# Patient Record
Sex: MALE | Race: BLACK OR AFRICAN AMERICAN | Employment: FULL TIME | URBAN - METROPOLITAN AREA
[De-identification: names, ages, dates, MRNs, and addresses within clinical notes are randomized per-mention and may not be internally consistent; named-entity substitution may affect disease eponyms.]

---

## 2018-08-06 ENCOUNTER — HOSPITAL ENCOUNTER (EMERGENCY)
Age: 48
Discharge: HOME | End: 2018-08-06
Attending: EMERGENCY MEDICINE
Payer: SELF-PAY

## 2018-08-06 ENCOUNTER — APPOINTMENT (OUTPATIENT)
Dept: GENERAL RADIOLOGY | Age: 48
End: 2018-08-06
Attending: EMERGENCY MEDICINE
Payer: SELF-PAY

## 2018-08-06 VITALS
BODY MASS INDEX: 28.88 KG/M2 | SYSTOLIC BLOOD PRESSURE: 147 MMHG | HEIGHT: 69 IN | OXYGEN SATURATION: 97 % | RESPIRATION RATE: 16 BRPM | DIASTOLIC BLOOD PRESSURE: 96 MMHG | HEART RATE: 67 BPM | TEMPERATURE: 98.2 F | WEIGHT: 195 LBS

## 2018-08-06 DIAGNOSIS — S90.822A BLISTER OF LEFT HEEL, INITIAL ENCOUNTER: Primary | ICD-10-CM

## 2018-08-06 PROCEDURE — 77030036687 HC SHOE PSTOP S2SG -A

## 2018-08-06 PROCEDURE — 99283 EMERGENCY DEPT VISIT LOW MDM: CPT

## 2018-08-06 PROCEDURE — 73630 X-RAY EXAM OF FOOT: CPT

## 2018-08-06 NOTE — LETTER
NOTIFICATION RETURN TO WORK / SCHOOL 
 
8/6/2018 9:35 AM 
 
Mr. Jannet [facility] [id number] To Whom It May Concern: 
 
Jannet Abraham is currently under the care of [facility] EMERGENCY DEPT. He will return to work/school on: 8/10/18 If there are questions or concerns please have the patient contact our office. Sincerely, Maria De Jesus Harrison MD

## 2018-08-06 NOTE — ED PROVIDER NOTES
EMERGENCY DEPARTMENT HISTORY AND PHYSICAL EXAM    9:04 AM      Date: 8/6/2018  Patient Name: Didier Gutiérrez    History of Presenting Illness     Chief Complaint   Patient presents with    Foot Pain         History Provided By: Patient    Chief Complaint: Foot Pain  Duration:4  Days  Timing:  Constant  Location: Left plantart surface of the heel  Quality: Sharp  Severity: Moderate  Modifying Factors: Exacerbated with walking  Associated Symptoms: denies any other associated signs or symptoms      Additional History (Context): 9:30 AM Didier Gutiérrez is a 52 y.o. male with h/o No pertinent MHx who presents to ED complaining of moderate sharp constant left foot pain to the plantart surface of the heel onset 4 days ago. Patient states that he thought it was a callous but the pain is \"really bad\". He has been using band aids with compound W and ointment on it with no relief of sx. States that the pain is exacerbated with walking. He believes the callous formed due to the shoes he wears for work as he is a . He states that it started out small and after he used the compound W it worsened. Denies stepping on any foreign object or trauma to the foot. No other concerns or symptoms at this time. PCP: None        Past History     Past Medical History:  History reviewed. No pertinent past medical history. Past Surgical History:  Past Surgical History:   Procedure Laterality Date    HX ORTHOPAEDIC      femur       Family History:  History reviewed. No pertinent family history. Social History:  Social History   Substance Use Topics    Smoking status: Current Every Day Smoker    Smokeless tobacco: Never Used    Alcohol use No       Allergies:  No Known Allergies      Review of Systems       Review of Systems   Constitutional: Negative for chills and fever. Musculoskeletal:        Left foot pain   Skin:        Callous to  Bottom of the foot   All other systems reviewed and are negative.         Physical Exam     Visit Vitals    BP (!) 147/96 (BP 1 Location: Right arm, BP Patient Position: Sitting)    Pulse 67    Temp 98.2 °F (36.8 °C)    Resp 16    Ht 5' 9\" (1.753 m)    Wt 88.5 kg (195 lb)    SpO2 97%    BMI 28.8 kg/m2         Physical Exam   Constitutional: He is oriented to person, place, and time. He appears well-developed and well-nourished. No distress. HENT:   Head: Normocephalic and atraumatic. Mouth/Throat: Oropharynx is clear and moist.   Eyes: Conjunctivae and EOM are normal. Pupils are equal, round, and reactive to light. No scleral icterus. Neck: Normal range of motion. Neck supple. Cardiovascular: Normal rate, regular rhythm and normal heart sounds. No murmur heard. Pulmonary/Chest: Effort normal and breath sounds normal. No respiratory distress. Abdominal: Soft. Bowel sounds are normal. He exhibits no distension. There is no tenderness. Musculoskeletal: He exhibits no edema. Lymphadenopathy:     He has no cervical adenopathy. Neurological: He is alert and oriented to person, place, and time. Coordination normal.   Skin: Skin is warm and dry. No rash noted. [id number] callous on plantar surface of heel   Psychiatric: He has a normal mood and affect. His behavior is normal.   Nursing note and vitals reviewed. Diagnostic Study Results     Labs -  No results found for this or any previous visit (from the past 12 hour(s)). Radiologic Studies -   XR FOOT LT MIN 3 V   Final Result   Impression:      IMPRESSION:  1. No acute osseous abnormality or malalignment. 2. Mild left first MTP joint osteoarthritis. 3. Small plantar calcaneal spur. Medical Decision Making   I am the first provider for this patient. I reviewed the vital signs, available nursing notes, past medical history, past surgical history, family history and social history. Vital Signs-Reviewed the patient's vital signs.     Pulse Oximetry Analysis -  97% on room air ,WNL    Records Reviewed: Nursing Notes and Old Medical Records (Time of Review: 9:04 AM)    ED Course: Progress Notes, Reevaluation, and Consults:      Provider Notes (Medical Decision Making):   MDM  Number of Diagnoses or Management Options  Blister of left heel, initial encounter:   Diagnosis management comments: Chemical irritation and blister of heel no evidence of infection will tx supportive care and podiatry referral       Diagnosis     Clinical Impression:   1. Blister of left heel, initial encounter        Disposition: DC    Follow-up Information     Follow up With Details Comments Contact [facility] EMERGENCY DEPT  As needed, If symptoms worsen [address] [place] [address] Schedule an appointment as soon as possible for a visit for ED follow up appointment    [facility] and [address] [place] [address]  [phone]             Patient's Medications    No medications on file     _______________________________    Attestations:  Ajit Moreno acting as a scribe for and in the presence of Nathan Singer MD      August 06, 2018 at 9:04 AM       Provider Attestation:      I personally performed the services described in the documentation, reviewed the documentation, as recorded by the scribe in my presence, and it accurately and completely records my words and actions.  August 06, 2018 at 9:04 AM - Nathan Singer MD    _______________________________

## 2020-06-08 ENCOUNTER — HOSPITAL ENCOUNTER (EMERGENCY)
Age: 50
Discharge: HOME | End: 2020-06-08
Attending: EMERGENCY MEDICINE
Payer: COMMERCIAL

## 2020-06-08 VITALS
WEIGHT: 180 LBS | RESPIRATION RATE: 17 BRPM | OXYGEN SATURATION: 99 % | HEIGHT: 70 IN | SYSTOLIC BLOOD PRESSURE: 153 MMHG | TEMPERATURE: 98.2 F | BODY MASS INDEX: 25.77 KG/M2 | DIASTOLIC BLOOD PRESSURE: 89 MMHG | HEART RATE: 86 BPM

## 2020-06-08 DIAGNOSIS — T50.901A ACCIDENTAL DRUG OVERDOSE, INITIAL ENCOUNTER: Primary | ICD-10-CM

## 2020-06-08 DIAGNOSIS — F12.10 MARIJUANA ABUSE: ICD-10-CM

## 2020-06-08 DIAGNOSIS — R44.0 AUDITORY HALLUCINATION: ICD-10-CM

## 2020-06-08 LAB
ALBUMIN SERPL-MCNC: 3.7 G/DL (ref 3.4–5)
ALBUMIN/GLOB SERPL: 1 {RATIO} (ref 0.8–1.7)
ALP SERPL-CCNC: 84 U/L (ref 45–117)
ALT SERPL-CCNC: 33 U/L (ref 16–61)
AMPHET UR QL SCN: NEGATIVE
ANION GAP SERPL CALC-SCNC: 3 MMOL/L (ref 3–18)
APAP SERPL-MCNC: <2 UG/ML (ref 10–30)
AST SERPL-CCNC: 15 U/L (ref 10–38)
ATRIAL RATE: 56 BPM
BARBITURATES UR QL SCN: NEGATIVE
BASOPHILS # BLD: 0 K/UL (ref 0–0.1)
BASOPHILS NFR BLD: 0 % (ref 0–2)
BENZODIAZ UR QL: NEGATIVE
BILIRUB SERPL-MCNC: 0.3 MG/DL (ref 0.2–1)
BUN SERPL-MCNC: 12 MG/DL (ref 7–18)
BUN/CREAT SERPL: 10 (ref 12–20)
CALCIUM SERPL-MCNC: 8.6 MG/DL (ref 8.5–10.1)
CALCULATED P AXIS, ECG09: 57 DEGREES
CALCULATED R AXIS, ECG10: -21 DEGREES
CALCULATED T AXIS, ECG11: 0 DEGREES
CANNABINOIDS UR QL SCN: POSITIVE
CHLORIDE SERPL-SCNC: 108 MMOL/L (ref 100–111)
CK MB CFR SERPL CALC: 0.6 % (ref 0–4)
CK MB SERPL-MCNC: 1.7 NG/ML (ref 5–25)
CK SERPL-CCNC: 271 U/L (ref 39–308)
CO2 SERPL-SCNC: 29 MMOL/L (ref 21–32)
COCAINE UR QL SCN: NEGATIVE
CREAT SERPL-MCNC: 1.16 MG/DL (ref 0.6–1.3)
DIAGNOSIS, 93000: NORMAL
DIFFERENTIAL METHOD BLD: NORMAL
EOSINOPHIL # BLD: 0.1 K/UL (ref 0–0.4)
EOSINOPHIL NFR BLD: 1 % (ref 0–5)
ERYTHROCYTE [DISTWIDTH] IN BLOOD BY AUTOMATED COUNT: 14 % (ref 11.6–14.5)
ETHANOL SERPL-MCNC: <3 MG/DL (ref 0–3)
GLOBULIN SER CALC-MCNC: 3.7 G/DL (ref 2–4)
GLUCOSE SERPL-MCNC: 103 MG/DL (ref 74–99)
HCT VFR BLD AUTO: 42.1 % (ref 36–48)
HDSCOM,HDSCOM: ABNORMAL
HGB BLD-MCNC: 14.1 G/DL (ref 13–16)
LYMPHOCYTES # BLD: 1.8 K/UL (ref 0.9–3.6)
LYMPHOCYTES NFR BLD: 27 % (ref 21–52)
MCH RBC QN AUTO: 28.5 PG (ref 24–34)
MCHC RBC AUTO-ENTMCNC: 33.5 G/DL (ref 31–37)
MCV RBC AUTO: 85.2 FL (ref 74–97)
METHADONE UR QL: NEGATIVE
MONOCYTES # BLD: 0.6 K/UL (ref 0.05–1.2)
MONOCYTES NFR BLD: 9 % (ref 3–10)
NEUTS SEG # BLD: 4.2 K/UL (ref 1.8–8)
NEUTS SEG NFR BLD: 63 % (ref 40–73)
OPIATES UR QL: NEGATIVE
P-R INTERVAL, ECG05: 162 MS
PCP UR QL: NEGATIVE
PLATELET # BLD AUTO: 326 K/UL (ref 135–420)
PMV BLD AUTO: 9.7 FL (ref 9.2–11.8)
POTASSIUM SERPL-SCNC: 3.6 MMOL/L (ref 3.5–5.5)
PROT SERPL-MCNC: 7.4 G/DL (ref 6.4–8.2)
Q-T INTERVAL, ECG07: 412 MS
QRS DURATION, ECG06: 90 MS
QTC CALCULATION (BEZET), ECG08: 397 MS
RBC # BLD AUTO: 4.94 M/UL (ref 4.7–5.5)
SALICYLATES SERPL-MCNC: 2.7 MG/DL (ref 2.8–20)
SODIUM SERPL-SCNC: 140 MMOL/L (ref 136–145)
TROPONIN I SERPL-MCNC: <0.02 NG/ML (ref 0–0.04)
VENTRICULAR RATE, ECG03: 56 BPM
WBC # BLD AUTO: 6.7 K/UL (ref 4.6–13.2)

## 2020-06-08 PROCEDURE — 80053 COMPREHEN METABOLIC PANEL: CPT

## 2020-06-08 PROCEDURE — 80307 DRUG TEST PRSMV CHEM ANLYZR: CPT

## 2020-06-08 PROCEDURE — 74011250636 HC RX REV CODE- 250/636: Performed by: EMERGENCY MEDICINE

## 2020-06-08 PROCEDURE — 99285 EMERGENCY DEPT VISIT HI MDM: CPT

## 2020-06-08 PROCEDURE — 85025 COMPLETE CBC W/AUTO DIFF WBC: CPT

## 2020-06-08 PROCEDURE — 82550 ASSAY OF CK (CPK): CPT

## 2020-06-08 PROCEDURE — 93005 ELECTROCARDIOGRAM TRACING: CPT

## 2020-06-08 PROCEDURE — 74011000250 HC RX REV CODE- 250: Performed by: EMERGENCY MEDICINE

## 2020-06-08 RX ADMIN — POISON ADSORBENT 50 G: 50 SUSPENSION ORAL at 01:18

## 2020-06-08 RX ADMIN — SODIUM CHLORIDE 1000 ML: 900 INJECTION, SOLUTION INTRAVENOUS at 01:47

## 2020-06-08 NOTE — DISCHARGE INSTRUCTIONS
Patient Education        Marijuana Use: Care Instructions  Overview  During your exam, traces of marijuana were found in your body. The two most active chemicals in marijuana are THC and CBD. THC affects how you think, act, and feel. It can make you feel very happy or \"high. \" CBD can help you feel relaxed without the \"high. \" Marijuana products usually contain both THC and CBD. THC usually can be found in urine for a few days after marijuana is used. If you regularly use a lot of marijuana, THC may be found for weeks after use has stopped. There are many types, or strains, of marijuana. Each strain has specific THC-to-CBD ratios. Because of this, some strains have different kinds of effects than others. For example, if a strain of marijuana has a higher ratio of THC to CBD, it's more likely to affect your judgment, coordination, and decision making. In the [place], it's against federal law to possess, sell, give away, or grow marijuana for any purpose. But many states allow people with certain health problems to buy or grow it for their own use. And some states allow people to use it for recreational reasons. These laws vary from state to state. You can call your state department of health or health services to learn more about the laws in your state. If you live in a state where marijuana is legal, know your employer's policies about use. A positive drug test might cause you to lose your job. Or it might keep you from getting hired. If you use marijuana, take steps to lower your risk. Follow-up care is a key part of your treatment and safety. Be sure to make and go to all appointments, and call your doctor if you are having problems. It's also a good idea to know your test results and keep a list of the medicines you take. How can you care for yourself at home? · To have the lowest risk, don't use marijuana. But if you do use it, limit your use. · Know what you're using.  Choose products that have low levels of THC. The type (or strain), strength, and effects of marijuana can vary greatly. And understand how soon you may feel the effects of the product you use and how long those effects may last. The product label may have this information. · Don't drive or operate machinery after using marijuana. Using marijuana may affect your judgment, coordination, and decision making. · Don't smoke marijuana. The smoke can damage your lungs. If you do smoke it, don't breathe in deeply and don't hold your breath. · Don't use marijuana with alcohol, tobacco, or illegal drugs. · Reduce the risk of medicine interactions. Marijuana can be dangerous if you take it with blood thinners or with medicines that make you sleepy, control your mood, or lower your blood pressure. Talk to your doctor about other medicines you use before you try marijuana. · Keep others safe. Store marijuana in a safe and secure place. This is even more important with edible marijuana, which can be easily mistaken for treats or snacks. Make sure that children, friends, family, and pets can't get to it. And protect others from secondhand smoke. When should you call for help? Call your doctor now or seek immediate medical care if:  · You have new or worse symptoms of cannabis hyperemesis syndrome (CHS), such as:  ? Vomiting that doesn't stop. ? Not being able to keep down fluids. ? Belly pain. ? Symptoms that go away briefly when you take a hot bath or shower. This is one of the signs of CHS. · You have symptoms of dehydration, such as:  ? Dry eyes and a dry mouth. ? Passing only a little dark urine. ? Feeling thirstier than usual.  Watch closely for changes in your health, and contact your doctor if:  · You think you have a problem with marijuana use. Where can you learn more? Go to http://doe-mercedes.info/  Enter P683 in the search box to learn more about \"Marijuana Use: Care Instructions. \"  Current as of: August 22, 6885               [employer] Version: 12.5  © 1625-5847 [employer]. Care instructions adapted under license by [employer] (which disclaims liability or warranty for this information). If you have questions about a medical condition or this instruction, always ask your healthcare professional. [address] any warranty or liability for your use of this information. Patient Education        Accidental Overdose of Medicine: Care Instructions  Your Care Instructions     Almost any medicine can cause harm if you take too much of it. You have had treatment to help your body get rid of an overdose of a medicine. This may have been an over-the-counter medicine. Or it might be one that a doctor prescribed. It may even have been a vitamin or a supplement. During treatment, the doctor may have given you fluids and medicine. You also may have had lab tests. Then the doctor made sure that you were well enough to go home. The doctor has checked you carefully, but problems can develop later. If you notice any problems or new symptoms, get medical treatment right away. Follow-up care is a key part of your treatment and safety. Be sure to make and go to all appointments, and call your doctor if you are having problems. It's also a good idea to know your test results and keep a list of the medicines you take. How can you care for yourself at home? Home care  · Talk with your doctor about what you should eat or drink. · If you normally take medicines, ask your doctor when you can start taking them again. · Read the information that comes with any medicine. If you have questions, ask your doctor or pharmacist.  Prevention  · Be safe with medicines. Take your medicines exactly as prescribed or as the label directs. Call your doctor if you think you are having a problem with your medicine. · Keep your medicines in the containers they came in.  Keep them with the original labels. · Find out what to do if you miss a dose of your medicine. When should you call for help? Poison control centers, hospitals, or your doctor can give immediate advice in the case of a poisoning. The [facility] number is [phone]. Have the poison container with you so you can give complete information to the poison control center. This includes what the poison or substance is, when it was taken, and how much was taken. Do not try to make the person vomit. [id number] anytime you think you may need emergency care. For example, call if you or someone else:  · Has used or currently uses alcohol or drugs and is very confused or can't stay awake. · Has passed out (lost consciousness). · Has severe trouble breathing. · Is having a seizure. Call your doctor now or seek immediate medical care if:  · You are vomiting. · You have a new or worse headache. · You are dizzy or lightheaded or feel like you may faint. Watch closely for changes in your health, and be sure to contact your doctor if:  · You do not get better as expected. Where can you learn more? Go to http://doe-mercedes.info/  Enter C165 in the search box to learn more about \"Accidental Overdose of Medicine: Care Instructions. \"  Current as of: August 22, 2019               Content Version: 12.5  © 3323-9381 [employer]. Care instructions adapted under license by [employer] (which disclaims liability or warranty for this information). If you have questions about a medical condition or this instruction, always ask your healthcare professional. [place] [address] any warranty or liability for your use of this information.

## 2020-06-08 NOTE — ED NOTES
Poison Control called spoke with Johnna Kaminski- patient update given, vital signs, lab results, EKG interpretation

## 2020-06-08 NOTE — PROGRESS NOTES
Referral received to assist with outpatient behavioral follow up. Chart reviewed. Spoke with pt. He wants to leave now to check on his children. He was made aware that psychiatrist recommends for him to follow up with behavioral services. Face sheet information verified and not accurate. Pt has insurance and his phone is incorrect. Gave information to registration and chart updated. Pt states has a medical appointment at [facility] this Friday 6/12/20. Pt unable to name the provider he is scheduled to see. Pt agreed for me to assist with behavioral follow up and will call him with information. Called [facility] and left a message to return call.

## 2020-06-08 NOTE — ED TRIAGE NOTES
Pt to ED via EMS c/o intentional overdose on sleeping medicine. States he 'just wants to sleep and see (his) step son ' who  2 years ago. Pt states he doesn't want to kill himself just that he wanted to sleep so he could see his son. States he also took 1 gabapentin of unknown dosage. Pt states he heard voices telling him to go to sleep and that he used to hear them back when he was in the [employer] as well. States he only ever has auditory hallucinations- only ever tell him to 'go to sleep', denies visual hallucinations. Has never been seen by psych. Denies SI/HI to this nurse.

## 2020-06-08 NOTE — ED NOTES
Poison center staff called and this nurse spoke with her regarding the status of the patient. Patient is currently alert with clear speech.

## 2020-06-08 NOTE — ED PROVIDER NOTES
[facility] EMERGENCY DEPT      1:29 AM    Date: 6/8/2020  Patient Name: Devorah Mcmahon    History of Presenting Illness     Chief Complaint   Patient presents with    Drug Overdose    Mental Health Problem       52 y.o. male with noted past medical history who presents to the emergency department status post overdose of sleeping pill and taking 1 gabapentin as well. The patient states that he has a history of auditory hallucinations and the voices always tell him to sleep. He is had these auditory elucidation's only telling him to sleep since he was in the [place] 30 years ago. He states that gotten worse since his son was killed. Tonight he had a voice telling him that he needed to go to sleep he took 8 sleep aid pills with the brand name \"sleep aid\". He also took 1 gabapentin he is unsure of the dosing that was full-term on the street. On initial MD exam the patient he is awake alert has no GI distress and no somnolence. He denies any suicidal homicidal ideation. Patient denies any other associated signs or symptoms. Patient denies any other complaints. Nursing notes regarding the HPI and triage nursing notes were reviewed. Prior medical records were reviewed. Past History     Past Medical History:  History reviewed. No pertinent past medical history. Past Surgical History:  Past Surgical History:   Procedure Laterality Date    HX ORTHOPAEDIC      femur       Family History:  History reviewed. No pertinent family history. Social History:  Social History     Tobacco Use    Smoking status: Current Every Day Smoker    Smokeless tobacco: Never Used   Substance Use Topics    Alcohol use: No    Drug use: Not on file       Allergies:  No Known Allergies    Patient's primary care provider (as noted in EPIC):  None    Review of Systems   Constitutional: Negative for diaphoresis. HENT: Negative for congestion. Eyes: Negative for discharge. Respiratory: Negative for stridor. Cardiovascular: Negative for palpitations. Gastrointestinal: Negative for diarrhea. Endocrine: Negative for heat intolerance. Genitourinary: Negative for flank pain. Musculoskeletal: Negative for back pain. Neurological: Negative for weakness. Psychiatric/Behavioral: Negative for hallucinations and suicidal ideas. All other systems reviewed and are negative. Visit Vitals  /79   Pulse 86   Temp 98.2 °F (36.8 °C)   Resp 18   Ht 5' 10\" (1.778 m)   Wt 81.6 kg (180 lb)   SpO2 99%   BMI 25.83 kg/m²       PHYSICAL EXAM:    CONSTITUTIONAL:  Alert, in no apparent distress;  well developed;  well nourished. HEAD:  Normocephalic, atraumatic. EYES:  EOMI. Non-icteric sclera. Normal conjunctiva. ENTM:  Nose:  no rhinorrhea. Throat:  no erythema or exudate, mucous membranes moist.  NECK:  No JVD. Supple  RESPIRATORY:  Chest clear, equal breath sounds, good air movement. CARDIOVASCULAR:  Regular rate and rhythm. No murmurs, rubs, or gallops. GI:  Normal bowel sounds, abdomen soft and non-tender. No rebound or guarding. BACK:  Non-tender. UPPER EXT:  Normal inspection. LOWER EXT:  No edema, no calf tenderness. Distal pulses intact. NEURO:  Moves all four extremities. Normal motor exam and sensation in all four extremities. Normal CN II-XII exam.  Normal bilateral finger-to-nose exam.     SKIN:  No rashes;  Normal for age. PSYCH:  Alert and normal affect. DIFFERENTIAL DIAGNOSES/ MEDICAL DECISION MAKING:  Hypoglycemia, acute alcohol, drug, or multipharmacy intoxication, sepsis from numerous possible sources including urosepsis, pneumonia, meningitis, significant CVA, TIA, intracerebral hemorrhage, subdural hemorrhage, seizure, significant trauma, electrolyte or hormonal imbalance, other etiologies, versus a combination of the above.     ED COURSE:    Consult Poison Control at [phone]    [facility] was called and the patient was presented for treatment recommendations for overdose of:  'Sleep aid' medication - 8 tablets  1 gabapentin tablet, unknown dose    Treatment recommendations from the poison control center specific to this patient presentation today are:  Bleeding component and sleepy medications diphenhydramine. No worry about the 1 gabapentin tablet as per poison control. Recommendations per poison control as follows: Observation 6 hours from time of ingestion. If medically cleared given his presentation would consult tele-psychiatry. Critical Care Note:  Overdose    Critical care minutes: 52 MINUTES. Given patients presentation to ed with noted overdose, serial neurological examinations were performed, as well as evaluation of vital signs. Given the patients underlying condition medical intervention(s) were needed, requiring numerous reevaluations of patient's vital signs and response to different emergency department therapies, total bedside time evaluating and/or treating the patient, not including procedures, is noted below. Signout Note      Pt care transferred to Dr. Trisha Lozano  ,ED provider. History of patient complaint(s), available diagnostic reports and current treatment plan has been discussed thoroughly. Bedside rounding on patient occured : no . Intended disposition of patient : To be determined  Pending diagnostics reports and/or labs (please list): 6-hour observation window would and at 0700 hrs. this morning. If patient is normal then with no somnolence or no anticholinergic effects, can be medically cleared. Would consult tele-psychiatry after that given the patient's auditory commands with overdose ingestion today.         Diagnostic Study Results     Abnormal lab results from this emergency department encounter:  Labs Reviewed   METABOLIC PANEL, COMPREHENSIVE - Abnormal; Notable for the following components:       Result Value    Glucose 103 (*)     BUN/Creatinine ratio 10 (*)     All other components within normal limits SALICYLATE - Abnormal; Notable for the following components:    Salicylate level 2.7 (*)     All other components within normal limits   ACETAMINOPHEN - Abnormal; Notable for the following components:    Acetaminophen level <2 (*)     All other components within normal limits   DRUG SCREEN, URINE - Abnormal; Notable for the following components:    THC (TH-CANNABINOL) Positive (*)     All other components within normal limits   CBC WITH AUTOMATED DIFF   CARDIAC PANEL,(CK, CKMB & TROPONIN)   ETHYL ALCOHOL       Lab values for this patient within approximately the last 12 hours:  Recent Results (from the past 12 hour(s))   CBC WITH AUTOMATED DIFF    Collection Time: 06/08/20  1:20 AM   Result Value Ref Range    WBC 6.7 4.6 - 13.2 K/uL    RBC 4.94 4.70 - 5.50 M/uL    HGB 14.1 13.0 - 16.0 g/dL    HCT 42.1 36.0 - 48.0 %    MCV 85.2 74.0 - 97.0 FL    MCH 28.5 24.0 - 34.0 PG    MCHC 33.5 31.0 - 37.0 g/dL    RDW 14.0 11.6 - 14.5 %    PLATELET 058 252 - 723 K/uL    MPV 9.7 9.2 - 11.8 FL    NEUTROPHILS 63 40 - 73 %    LYMPHOCYTES 27 21 - 52 %    MONOCYTES 9 3 - 10 %    EOSINOPHILS 1 0 - 5 %    BASOPHILS 0 0 - 2 %    ABS. NEUTROPHILS 4.2 1.8 - 8.0 K/UL    ABS. LYMPHOCYTES 1.8 0.9 - 3.6 K/UL    ABS. MONOCYTES 0.6 0.05 - 1.2 K/UL    ABS. EOSINOPHILS 0.1 0.0 - 0.4 K/UL    ABS. BASOPHILS 0.0 0.0 - 0.1 K/UL    DF AUTOMATED     METABOLIC PANEL, COMPREHENSIVE    Collection Time: 06/08/20  1:20 AM   Result Value Ref Range    Sodium 140 136 - 145 mmol/L    Potassium 3.6 3.5 - 5.5 mmol/L    Chloride 108 100 - 111 mmol/L    CO2 29 21 - 32 mmol/L    Anion gap 3 3.0 - 18 mmol/L    Glucose 103 (H) 74 - 99 mg/dL    BUN 12 7.0 - 18 MG/DL    Creatinine 1.16 0.6 - 1.3 MG/DL    BUN/Creatinine ratio 10 (L) 12 - 20      GFR est AA >60 >60 ml/min/1.73m2    GFR est non-AA >60 >60 ml/min/1.73m2    Calcium 8.6 8.5 - 10.1 MG/DL    Bilirubin, total 0.3 0.2 - 1.0 MG/DL    ALT (SGPT) 33 16 - 61 U/L    AST (SGOT) 15 10 - 38 U/L    Alk.  phosphatase 84 45 - 117 U/L    Protein, total 7.4 6.4 - 8.2 g/dL    Albumin 3.7 3.4 - 5.0 g/dL    Globulin 3.7 2.0 - 4.0 g/dL    A-G Ratio 1.0 0.8 - 1.7     CARDIAC PANEL,(CK, CKMB & TROPONIN)    Collection Time: 06/08/20  1:20 AM   Result Value Ref Range    CK - MB 1.7 <3.6 ng/ml    CK-MB Index 0.6 0.0 - 4.0 %     39 - 308 U/L    Troponin-I, QT <0.02 0.0 - 0.045 NG/ML   DRUG SCREEN, URINE    Collection Time: 06/08/20  1:20 AM   Result Value Ref Range    BENZODIAZEPINES Negative NEG      BARBITURATES Negative NEG      THC (TH-CANNABINOL) Positive (A) NEG      OPIATES Negative NEG      PCP(PHENCYCLIDINE) Negative NEG      COCAINE Negative NEG      AMPHETAMINES Negative NEG      METHADONE Negative NEG      HDSCOM (NOTE)    SALICYLATE    Collection Time: 06/08/20  1:30 AM   Result Value Ref Range    Salicylate level 2.7 (L) 2.8 - 20.0 MG/DL   ACETAMINOPHEN    Collection Time: 06/08/20  1:30 AM   Result Value Ref Range    Acetaminophen level <2 (L) 10.0 - 30.0 ug/mL   ETHYL ALCOHOL    Collection Time: 06/08/20  1:30 AM   Result Value Ref Range    ALCOHOL(ETHYL),SERUM <3 0 - 3 MG/DL   EKG, 12 LEAD, SUBSEQUENT    Collection Time: 06/08/20  1:39 AM   Result Value Ref Range    Ventricular Rate 56 BPM    Atrial Rate 56 BPM    P-R Interval 162 ms    QRS Duration 90 ms    Q-T Interval 412 ms    QTC Calculation (Bezet) 397 ms    Calculated P Axis 57 degrees    Calculated R Axis -21 degrees    Calculated T Axis 0 degrees    Diagnosis       Sinus bradycardia  Minimal voltage criteria for LVH, may be normal variant ( R in aVL )  Borderline ECG  No previous ECGs available         Radiologist and cardiologist interpretations if available at time of this note:  No results found.      Emergency physician interpretation of EKG: Sinus bradycardia about 55 bpm.    Medication(s) ordered for patient during this emergency visit encounter:  Medications   sodium chloride 0.9 % bolus infusion 1,000 mL (0 mL IntraVENous IV Completed 6/8/20 0248)   activated charcoal-sorbitoL (ACTIDOSE) oral suspension 50 g (50 g Oral Given 6/8/20 0118)       Medical Decision Making     I am the first provider for this patient. I reviewed the vital signs, available nursing notes, past medical history, past surgical history, family history and social history. Vital Signs:  Reviewed the patient's vital signs. Based on the patient's history of presenting illness, physical examination, laboratory, radiographic, and/or tests results, and response to medical interventions, I believe the patient most likely is noted overdose and needs admission for further evaluation, monitoring, and treatment. I believe the patient should be admitted for further evaluation, frequent neurologic examinations, and treatment. I spoke to the admitting attending and presented the patient to the admitting attending who agreed to accept the patient as an admission. Diagnoses:  1. Overdose of sleep aid medication      Coding Diagnoses     Clinical Impression:   1. Accidental drug overdose, initial encounter    2. Marijuana abuse        Disposition     Disposition:  TBD based on tele-psychiatry consult. Elida Burciaga M.D. BRANDIN Board Certified Emergency Physician    Provider Attestation:  If a scribe was utilized in generation of this patient record, I personally performed the services described in the documentation, reviewed the documentation, as recorded by the scribe in my presence, and it accurately records the patient's history of presenting illness, review of systems, patient physical examination, and procedures performed by me as the attending physician. FARTUN Monge Board Certified Emergency Physician  6/8/2020.  1:31 AM
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
## 2020-06-08 NOTE — PROGRESS NOTES
6/8/20  1000  Received a call back from Darlin Meigs from [facility] and scheduled an appointment for pt on 6/17/20 at 0930. She states that they need pt information. She was made aware that need consent from pt to release ED information and understood. 36 Call placed to pt and he states he's on a bus on way home and call back in 15 minutes. [phone] with pt and appointment information provided. Pt instructed what to bring to appointment. Pt gave permission for me to fax information to [facility].     [phone] Pt information faxed to [facility] [phone] with confirmation

## 2020-06-08 NOTE — ED NOTES
Bedside and Verbal shift change report given to Christie Vinson (oncoming nurse) by Kvng Ponce (offgoing nurse). Report included the following information SBAR, ED Summary, MAR and Recent Results.

## 2020-06-08 NOTE — ED NOTES
History: Patient was signed out at 6 AM by Dr. Sahil Johnson. Patient taken extra sleeping medication/gabapentin and was waiting to get medically cleared at 7 AM per poison control and then I will consult psychiatry. I reviewed patient's basic labs and CBC CMP appears to be without any abnormality cardiac markers are normal  No significant overdose is appreciated on salicylate or acetaminophen alcohol level is 0  Patient's EKG was nonspecific  No significant dysrhythmia on that was appreciated as well  ---  Patient does claim to have auditory hallucinations that are telling him to go to sleep       Vitals:  Patient Vitals for the past 12 hrs:   Temp Pulse Resp BP SpO2   06/08/20 0339   17 164/74 95 %   06/08/20 0330    138/71 92 %   06/08/20 0200    133/79 99 %   06/08/20 0130    (!) 147/91 97 %   06/08/20 0100    124/65 94 %   06/08/20 0057 98.2 °F (36.8 °C) 86 18 (!) 155/99 100 %       Medications ordered:   Medications   sodium chloride 0.9 % bolus infusion 1,000 mL (0 mL IntraVENous IV Completed 6/8/20 0248)   activated charcoal-sorbitoL (ACTIDOSE) oral suspension 50 g (50 g Oral Given 6/8/20 0118)         Progress notes, Consult notes or Re-evaluation: At 648 am  interviewed the patient again. He is awake alert oriented x3 very, individual states he tried to take fewer pills but as time went by he took more pills and did some marijuana to go to sleep but he is not try to kill himself  Unclear how the ambulance was called, he thinks he called the ambulance. According to the charge nurse patient was brought in by police. Patient is awake alert oriented at this point and is answering questions appropriately no specific internal stimuli is present currently    I placed a psychiatric consult for the patient to get evaluated further as an unclear the patient needs further intervention inpatient.   ---  7:40 AMPatient is in no distress  Patient's case has been discussed with psychiatry after they evaluate the patient  They requested the patient can go home but does need outpatient follow-up I will make sure case management is aware and that is appropriately set up. No further intervention is needed from the emergency department per the psychiatrist.  ---  8:58 AM  Ora  stop by and seen the patient and she will arrange for his outpatient follow-up  He also has been confirmed for an appointment on Friday with his PCP  Patient has no acute distress awake alert oriented would like to leave and those wishes right now. He will return if any changes or worsens. Discharge Note:  8:58 AM  The pt is ready for discharge. The pt's signs, symptoms, diagnosis, and discharge instructions have been discussed and pt has conveyed their understanding. The pt is to follow up as recommended or return to ER should their symptoms worsen. Plan has been discussed and pt is in agreement. Diagnostic Study Results     Labs -     Recent Results (from the past 12 hour(s))   CBC WITH AUTOMATED DIFF    Collection Time: 06/08/20  1:20 AM   Result Value Ref Range    WBC 6.7 4.6 - 13.2 K/uL    RBC 4.94 4.70 - 5.50 M/uL    HGB 14.1 13.0 - 16.0 g/dL    HCT 42.1 36.0 - 48.0 %    MCV 85.2 74.0 - 97.0 FL    MCH 28.5 24.0 - 34.0 PG    MCHC 33.5 31.0 - 37.0 g/dL    RDW 14.0 11.6 - 14.5 %    PLATELET 024 128 - 718 K/uL    MPV 9.7 9.2 - 11.8 FL    NEUTROPHILS 63 40 - 73 %    LYMPHOCYTES 27 21 - 52 %    MONOCYTES 9 3 - 10 %    EOSINOPHILS 1 0 - 5 %    BASOPHILS 0 0 - 2 %    ABS. NEUTROPHILS 4.2 1.8 - 8.0 K/UL    ABS. LYMPHOCYTES 1.8 0.9 - 3.6 K/UL    ABS. MONOCYTES 0.6 0.05 - 1.2 K/UL    ABS. EOSINOPHILS 0.1 0.0 - 0.4 K/UL    ABS.  BASOPHILS 0.0 0.0 - 0.1 K/UL    DF AUTOMATED     METABOLIC PANEL, COMPREHENSIVE    Collection Time: 06/08/20  1:20 AM   Result Value Ref Range    Sodium 140 136 - 145 mmol/L    Potassium 3.6 3.5 - 5.5 mmol/L    Chloride 108 100 - 111 mmol/L    CO2 29 21 - 32 mmol/L    Anion gap 3 3.0 - 18 mmol/L Glucose 103 (H) 74 - 99 mg/dL    BUN 12 7.0 - 18 MG/DL    Creatinine 1.16 0.6 - 1.3 MG/DL    BUN/Creatinine ratio 10 (L) 12 - 20      GFR est AA >60 >60 ml/min/1.73m2    GFR est non-AA >60 >60 ml/min/1.73m2    Calcium 8.6 8.5 - 10.1 MG/DL    Bilirubin, total 0.3 0.2 - 1.0 MG/DL    ALT (SGPT) 33 16 - 61 U/L    AST (SGOT) 15 10 - 38 U/L    Alk.  phosphatase 84 45 - 117 U/L    Protein, total 7.4 6.4 - 8.2 g/dL    Albumin 3.7 3.4 - 5.0 g/dL    Globulin 3.7 2.0 - 4.0 g/dL    A-G Ratio 1.0 0.8 - 1.7     CARDIAC PANEL,(CK, CKMB & TROPONIN)    Collection Time: 06/08/20  1:20 AM   Result Value Ref Range    CK - MB 1.7 <3.6 ng/ml    CK-MB Index 0.6 0.0 - 4.0 %     39 - 308 U/L    Troponin-I, QT <0.02 0.0 - 0.045 NG/ML   DRUG SCREEN, URINE    Collection Time: 06/08/20  1:20 AM   Result Value Ref Range    BENZODIAZEPINES Negative NEG      BARBITURATES Negative NEG      THC (TH-CANNABINOL) Positive (A) NEG      OPIATES Negative NEG      PCP(PHENCYCLIDINE) Negative NEG      COCAINE Negative NEG      AMPHETAMINES Negative NEG      METHADONE Negative NEG      HDSCOM (NOTE)    SALICYLATE    Collection Time: 06/08/20  1:30 AM   Result Value Ref Range    Salicylate level 2.7 (L) 2.8 - 20.0 MG/DL   ACETAMINOPHEN    Collection Time: 06/08/20  1:30 AM   Result Value Ref Range    Acetaminophen level <2 (L) 10.0 - 30.0 ug/mL   ETHYL ALCOHOL    Collection Time: 06/08/20  1:30 AM   Result Value Ref Range    ALCOHOL(ETHYL),SERUM <3 0 - 3 MG/DL   EKG, 12 LEAD, SUBSEQUENT    Collection Time: 06/08/20  1:39 AM   Result Value Ref Range    Ventricular Rate 56 BPM    Atrial Rate 56 BPM    P-R Interval 162 ms    QRS Duration 90 ms    Q-T Interval 412 ms    QTC Calculation (Bezet) 397 ms    Calculated P Axis 57 degrees    Calculated R Axis -21 degrees    Calculated T Axis 0 degrees    Diagnosis       Sinus bradycardia  Minimal voltage criteria for LVH, may be normal variant ( R in aVL )  Borderline ECG  No previous ECGs available Radiologic Studies -   No orders to display     CT Results  (Last 48 hours)    None        CXR Results  (Last 48 hours)    None            Discharge     Clinical Impression:   1. Accidental drug overdose, initial encounter    2.  Marijuana abuse        Disposition:  Home      Follow-up Information    None